# Patient Record
Sex: MALE | Race: WHITE | NOT HISPANIC OR LATINO | Employment: OTHER | URBAN - METROPOLITAN AREA
[De-identification: names, ages, dates, MRNs, and addresses within clinical notes are randomized per-mention and may not be internally consistent; named-entity substitution may affect disease eponyms.]

---

## 2017-04-20 ENCOUNTER — TELEPHONE (OUTPATIENT)
Dept: CARDIOLOGY | Facility: CLINIC | Age: 62
End: 2017-04-20

## 2017-04-20 NOTE — TELEPHONE ENCOUNTER
Betsy from [facility] called to see if the patient could hold his Coumadin.    I informed Betsy that we hadn't seen the patient since June 2014.  At that time he was not on Coumadin therapy.  He should call whoever gave him that prescription.    If the patient wants to be seen he will have to be re-evaluated.

## 2017-04-25 ENCOUNTER — OFFICE VISIT (OUTPATIENT)
Dept: UROLOGY | Facility: CLINIC | Age: 62
End: 2017-04-25

## 2017-04-25 ENCOUNTER — HOSPITAL ENCOUNTER (OUTPATIENT)
Dept: GENERAL RADIOLOGY | Facility: HOSPITAL | Age: 62
Discharge: HOME | End: 2017-04-25
Attending: UROLOGY | Admitting: UROLOGY

## 2017-04-25 DIAGNOSIS — N40.1 BENIGN NON-NODULAR PROSTATIC HYPERPLASIA WITH LOWER URINARY TRACT SYMPTOMS: ICD-10-CM

## 2017-04-25 DIAGNOSIS — N20.0 KIDNEY STONE: ICD-10-CM

## 2017-04-25 DIAGNOSIS — N20.0 KIDNEY STONE: Primary | ICD-10-CM

## 2017-04-25 PROCEDURE — 99214 OFFICE O/P EST MOD 30 MIN: CPT | Performed by: UROLOGY

## 2017-04-25 PROCEDURE — 74000 XR ABDOMEN KUB: CPT | Performed by: RADIOLOGY

## 2017-04-25 PROCEDURE — 74000 HC ABDOMEN KUB: CPT

## 2017-04-25 RX ORDER — WARFARIN SODIUM 5 MG/1
TABLET ORAL
Refills: 5 | COMMUNITY
Start: 2017-04-14

## 2017-04-25 RX ORDER — TERAZOSIN 5 MG/1
CAPSULE ORAL
Refills: 11 | COMMUNITY
Start: 2017-04-13

## 2017-04-25 RX ORDER — FINASTERIDE 5 MG/1
5 TABLET, FILM COATED ORAL DAILY
Qty: 30 TABLET | Refills: 11 | Status: SHIPPED | OUTPATIENT
Start: 2017-04-25 | End: 2018-04-23 | Stop reason: SDUPTHER

## 2017-04-25 RX ORDER — LISINOPRIL 20 MG/1
TABLET ORAL
Refills: 11 | COMMUNITY
Start: 2017-03-27

## 2017-04-25 RX ORDER — AMLODIPINE BESYLATE 10 MG/1
TABLET ORAL
Refills: 11 | COMMUNITY
Start: 2017-03-23

## 2017-04-25 RX ORDER — TAMSULOSIN HYDROCHLORIDE 0.4 MG/1
CAPSULE ORAL
Refills: 11 | COMMUNITY
Start: 2017-03-27 | End: 2017-04-25

## 2017-04-25 NOTE — PROGRESS NOTES
Chief Complaint:          Chief Complaint   Patient presents with   • Nephrolithiasis       HPI:   62 y.o. male.    HPI  He has passed 2 stones in the past year.  His right side he has had mild pain with movement  Pt has urgency and frequency and nocturia.    Past Medical History:        Past Medical History:   Diagnosis Date   • Atrial fibrillation    • Cardiac disorder    • Diverticulosis of colon    • Hypertension    • Kidney stone          Current Meds:     Current Outpatient Prescriptions   Medication Sig Dispense Refill   • amLODIPine (NORVASC) 10 MG tablet   11   • lisinopril (PRINIVIL,ZESTRIL) 20 MG tablet   11   • tamsulosin (FLOMAX) 0.4 MG capsule 24 hr capsule   11   • terazosin (HYTRIN) 5 MG capsule   11   • warfarin (COUMADIN) 5 MG tablet   5     No current facility-administered medications for this visit.         Allergies:      Allergies   Allergen Reactions   • Penicillins         Past Surgical History:     Past Surgical History:   Procedure Laterality Date   • COLONOSCOPY     • KNEE SURGERY           Social History:     Social History     Social History   • Marital status:      Spouse name: N/A   • Number of children: N/A   • Years of education: N/A     Occupational History   • Not on file.     Social History Main Topics   • Smoking status: Never Smoker   • Smokeless tobacco: Not on file   • Alcohol use No   • Drug use: Not on file   • Sexual activity: Not on file     Other Topics Concern   • Not on file     Social History Narrative   • No narrative on file       Family History:     Family History   Problem Relation Age of Onset   • No Known Problems Father    • No Known Problems Mother        Review of Systems:     Review of Systems    Physical Exam:     Physical Exam   Constitutional: He is oriented to person, place, and time.   Morbidly obese   HENT:   Head: Normocephalic and atraumatic.   Right Ear: External ear normal.   Left Ear: External ear normal.   Nose: Nose normal.   Mouth/Throat:  Oropharynx is clear and moist.   Eyes: Conjunctivae and EOM are normal. Pupils are equal, round, and reactive to light.   Neck: Normal range of motion. Neck supple. No thyromegaly present.   Cardiovascular: Normal rate, regular rhythm, normal heart sounds and intact distal pulses.    No murmur heard.  Pulmonary/Chest: Effort normal and breath sounds normal. No respiratory distress. He has no wheezes. He has no rales. He exhibits no tenderness.   Abdominal: Soft. Bowel sounds are normal. He exhibits no distension and no mass. There is no tenderness. No hernia.   Genitourinary: Rectum normal, prostate normal and penis normal.   Musculoskeletal: Normal range of motion. He exhibits no edema or tenderness.   Lymphadenopathy:     He has no cervical adenopathy.   Neurological: He is alert and oriented to person, place, and time. No cranial nerve deficit. He exhibits normal muscle tone. Coordination normal.   Skin: Skin is warm. No rash noted.   Psychiatric: He has a normal mood and affect. His behavior is normal. Judgment and thought content normal.   Nursing note and vitals reviewed.      Procedure:     No notes on file      Assessment:     Encounter Diagnosis   Name Primary?   • Kidney stone Yes     No orders of the defined types were placed in this encounter.      Plan:   Will check a kub.  Pt has a large 1.5 cm stone in lower pole of the right kidney.  This is causing intermittent pain.  Pt has urinary symptoms so will add finasteride to his hytrin.    Counseling was given to patient for the following topics instructions for management. and the interim medical history and current results were reviewed.  A treatment plan with follow-up was made. Total time of the encounter was 35 minutes and 35 minutes were spent discussing Kidney stone [N20.0] face-to-face.        This document has been electronically signed by Napoleon Mccord MD April 25, 2017 9:21 AM

## 2017-12-11 ENCOUNTER — HOSPITAL ENCOUNTER (OUTPATIENT)
Dept: CT IMAGING | Facility: HOSPITAL | Age: 62
Discharge: HOME | End: 2017-12-11
Attending: OTOLARYNGOLOGY | Admitting: OTOLARYNGOLOGY

## 2017-12-11 ENCOUNTER — TRANSCRIBE ORDERS (OUTPATIENT)
Dept: ADMINISTRATIVE | Facility: HOSPITAL | Age: 62
End: 2017-12-11

## 2017-12-11 DIAGNOSIS — J32.4 CHRONIC PANSINUSITIS: Primary | ICD-10-CM

## 2017-12-11 DIAGNOSIS — Z91.09 POLLEN ALLERGIES: ICD-10-CM

## 2017-12-11 DIAGNOSIS — R09.81 NASAL CONGESTION: ICD-10-CM

## 2017-12-11 DIAGNOSIS — J32.4 CHRONIC PANSINUSITIS: ICD-10-CM

## 2017-12-11 PROCEDURE — 70486 CT MAXILLOFACIAL W/O DYE: CPT

## 2017-12-11 PROCEDURE — 70486 CT MAXILLOFACIAL W/O DYE: CPT | Performed by: RADIOLOGY

## 2018-02-08 ENCOUNTER — TRANSCRIBE ORDERS (OUTPATIENT)
Dept: ADMINISTRATIVE | Facility: HOSPITAL | Age: 63
End: 2018-02-08

## 2018-02-08 DIAGNOSIS — J32.4 CHRONIC PANSINUSITIS: Primary | ICD-10-CM

## 2018-02-26 ENCOUNTER — APPOINTMENT (OUTPATIENT)
Dept: CT IMAGING | Facility: HOSPITAL | Age: 63
End: 2018-02-26
Attending: OTOLARYNGOLOGY

## 2018-04-23 DIAGNOSIS — N40.1 BENIGN NON-NODULAR PROSTATIC HYPERPLASIA WITH LOWER URINARY TRACT SYMPTOMS: ICD-10-CM

## 2018-04-23 RX ORDER — FINASTERIDE 5 MG/1
TABLET, FILM COATED ORAL
Qty: 30 TABLET | Refills: 11 | Status: SHIPPED | OUTPATIENT
Start: 2018-04-23

## 2020-10-28 ENCOUNTER — TRANSCRIBE ORDERS (OUTPATIENT)
Dept: ADMINISTRATIVE | Facility: HOSPITAL | Age: 65
End: 2020-10-28

## 2020-10-28 DIAGNOSIS — Z11.59 ENCOUNTER FOR SCREENING FOR OTHER VIRAL DISEASES: Primary | ICD-10-CM

## 2020-12-17 ENCOUNTER — IMMUNIZATION (OUTPATIENT)
Dept: VACCINE CLINIC | Facility: HOSPITAL | Age: 65
End: 2020-12-17

## 2020-12-17 PROCEDURE — 91300 HC SARSCOV02 VAC 30MCG/0.3ML IM: CPT | Performed by: FAMILY MEDICINE

## 2020-12-17 PROCEDURE — 0001A: CPT | Performed by: FAMILY MEDICINE

## 2021-01-08 ENCOUNTER — IMMUNIZATION (OUTPATIENT)
Dept: VACCINE CLINIC | Facility: HOSPITAL | Age: 66
End: 2021-01-08

## 2021-01-08 ENCOUNTER — APPOINTMENT (OUTPATIENT)
Dept: VACCINE CLINIC | Facility: HOSPITAL | Age: 66
End: 2021-01-08

## 2021-01-08 PROCEDURE — 0001A: CPT | Performed by: FAMILY MEDICINE

## 2021-01-08 PROCEDURE — 0002A: CPT | Performed by: FAMILY MEDICINE

## 2021-01-08 PROCEDURE — 91300 HC SARSCOV02 VAC 30MCG/0.3ML IM: CPT | Performed by: FAMILY MEDICINE

## 2025-03-12 ENCOUNTER — OFFICE VISIT (OUTPATIENT)
Dept: SURGERY | Facility: CLINIC | Age: 70
End: 2025-03-12
Payer: MEDICARE

## 2025-03-12 VITALS
SYSTOLIC BLOOD PRESSURE: 132 MMHG | WEIGHT: 315 LBS | HEIGHT: 76 IN | DIASTOLIC BLOOD PRESSURE: 84 MMHG | BODY MASS INDEX: 38.36 KG/M2

## 2025-03-12 DIAGNOSIS — K64.8 OTHER HEMORRHOIDS: Primary | ICD-10-CM

## 2025-03-12 PROCEDURE — 1159F MED LIST DOCD IN RCRD: CPT | Performed by: SURGERY

## 2025-03-12 PROCEDURE — 1160F RVW MEDS BY RX/DR IN RCRD: CPT | Performed by: SURGERY

## 2025-03-12 PROCEDURE — 99203 OFFICE O/P NEW LOW 30 MIN: CPT | Performed by: SURGERY

## 2025-03-12 RX ORDER — MONTELUKAST SODIUM 10 MG/1
TABLET ORAL
COMMUNITY
Start: 2025-02-01

## 2025-03-12 RX ORDER — LEVOCETIRIZINE DIHYDROCHLORIDE 5 MG/1
TABLET, FILM COATED ORAL
COMMUNITY
Start: 2025-02-01

## 2025-03-12 RX ORDER — DOXYCYCLINE 100 MG/1
CAPSULE ORAL
COMMUNITY
Start: 2025-01-07

## 2025-03-12 NOTE — PROGRESS NOTES
Subjective   Ray Edmondson is a 70 y.o. male is being seen for consultation today at the request of Michael Mina MD    Ray Edmondson is a 70 y.o. male     History of Present Illness  The patient presents for evaluation of hemorrhoids.    He has been experiencing hemorrhoidal issues for over a decade, with occasional episodes of severe swelling and pain lasting several days, followed by bleeding. These episodes occur once or twice a year, with symptom-free intervals of six months to a year. He reports that his symptoms are exacerbated by prolonged sitting on a stool at work and by certain sleeping positions. He sleeps in a recliner due to sinus issues. He reports no external hemorrhoids but notes occasional leakage of stool. His most recent colonoscopy was conducted less than a year ago by Dr. Corbin. He is open to considering rubber band ligation of his hemorrhoids if it is a straightforward procedure that does not require a lengthy recovery period. He has been managing his symptoms with MiraLAX, which he finds effective, although it results in pasty stools. He has previously used generic Preparation H and cortisone, prescribed by Dr. Rodriguez, but finds Tucks more beneficial. He also reports that thorough cleaning with wet toilet paper provides relief. He takes MiraLAX daily and previously used Metamucil, which he found helpful in lowering his cholesterol levels. He expresses a desire to resume Metamucil use. He recalls an incident 18 months ago when he had a tooth extraction complicated by an abscess. He has a history of alcohol abuse and expresses concern about potential overuse of narcotics. During this period, he consumed large quantities of Advil and Aleve. His oral surgeon subsequently performed a procedure under sedation to drain the abscess, after which he took two doses of Percocet. This led to severe constipation, necessitating a hospital visit. Since then, he has experienced persistent  "hemorrhoidal issues.    SOCIAL HISTORY  The patient has a history of alcoholism.    MEDICATIONS  Current: MiraLAX  Past: Metamucil, Advil, Aleve, Percocet, Preparation H, cortisone, Tucks    Past Medical History:   Diagnosis Date    Atrial fibrillation     Cardiac disorder     Diverticulosis of colon     Hypertension     Kidney stone        Family History   Problem Relation Age of Onset    No Known Problems Father     No Known Problems Mother        Social History     Socioeconomic History    Marital status:    Tobacco Use    Smoking status: Never    Smokeless tobacco: Never   Vaping Use    Vaping status: Never Used   Substance and Sexual Activity    Alcohol use: No    Drug use: Defer    Sexual activity: Defer       Past Surgical History:   Procedure Laterality Date    COLONOSCOPY      KNEE SURGERY         Review of Systems   Constitutional:  Negative for activity change, appetite change, chills and fever.   HENT:  Negative for sore throat and trouble swallowing.    Eyes:  Negative for visual disturbance.   Respiratory:  Negative for cough and shortness of breath.    Cardiovascular:  Negative for chest pain and palpitations.   Gastrointestinal:  Positive for constipation. Negative for abdominal distention, abdominal pain, blood in stool, diarrhea, nausea and vomiting.   Endocrine: Negative for cold intolerance and heat intolerance.   Genitourinary:  Negative for dysuria.   Musculoskeletal:  Negative for joint swelling.   Skin:  Negative for color change, rash and wound.   Allergic/Immunologic: Negative for immunocompromised state.   Neurological:  Negative for dizziness, seizures, weakness and headaches.   Hematological:  Negative for adenopathy. Does not bruise/bleed easily.   Psychiatric/Behavioral:  Negative for agitation and confusion.        Results        /84   Ht 193 cm (76\")   Wt (!) 157 kg (346 lb 6.4 oz)   BMI 42.17 kg/m²   Objective   Physical Exam  Constitutional:       Appearance: He is " well-developed.   HENT:      Head: Normocephalic and atraumatic.   Eyes:      Conjunctiva/sclera: Conjunctivae normal.      Pupils: Pupils are equal, round, and reactive to light.   Neck:      Thyroid: No thyromegaly.      Vascular: No JVD.      Trachea: No tracheal deviation.   Cardiovascular:      Rate and Rhythm: Normal rate and regular rhythm.      Heart sounds: No murmur heard.     No friction rub. No gallop.   Pulmonary:      Effort: Pulmonary effort is normal.      Breath sounds: Normal breath sounds.   Abdominal:      General: There is no distension.      Palpations: Abdomen is soft. There is no hepatomegaly or splenomegaly.      Tenderness: There is no abdominal tenderness.      Hernia: No hernia is present.   Musculoskeletal:         General: No deformity. Normal range of motion.      Cervical back: Neck supple.   Skin:     General: Skin is warm and dry.   Neurological:      Mental Status: He is alert and oriented to person, place, and time.       Physical Exam      Assessment & Plan  1. Hemorrhoids.  The patient's internal hemorrhoids are causing discomfort, swelling, and occasional bleeding. He has been using MiraLAX daily, which may be contributing to pasty bowel movements. He has also used generic Preparation H and cortisone cream in the past, with some relief from Tucks pads. He was advised to reduce the frequency of MiraLAX to every other day and consider alternating with Metamucil to achieve a soft, formed bowel movement. The possibility of rubber band ligation for the internal hemorrhoids was discussed, which could be performed during an anal examination. He was informed that the procedure might cause some pressure but should not be painful. He was advised to contact the office when he feels ready to schedule the procedure.    PROCEDURE  The patient underwent a tooth extraction with abscess drainage under sedation 18 months ago.          Assessment   Diagnoses and all orders for this visit:    1.  Other hemorrhoids (Primary)        Assessment & Plan      Ray Edmondson is a 70 y.o. male with constipation and possible internal hemorrhoids.  Patient would like to defer surgery at this time as are not bothersome and he would like to coordinate this with work.  He will initiate bowel regimen with MiraLAX and fiber and follow-up as needed.    Class 3 Severe Obesity (BMI >=40). Obesity-related health conditions include the following: none. Obesity is unchanged. BMI is is above average; BMI management plan is completed. We discussed portion control and increasing exercise.            This document has been electronically signed by Moe Nobles MD   March 12, 2025 09:48 EDT    Patient or patient representative verbalized consent for the use of Ambient Listening during the visit with  Moe Nobles MD for chart documentation. 3/12/2025  11:15 EDT